# Patient Record
Sex: FEMALE | Race: WHITE | HISPANIC OR LATINO
[De-identification: names, ages, dates, MRNs, and addresses within clinical notes are randomized per-mention and may not be internally consistent; named-entity substitution may affect disease eponyms.]

---

## 2021-07-27 PROBLEM — Z00.00 ENCOUNTER FOR PREVENTIVE HEALTH EXAMINATION: Status: ACTIVE | Noted: 2021-07-27

## 2021-07-28 ENCOUNTER — APPOINTMENT (OUTPATIENT)
Dept: PEDIATRIC ORTHOPEDIC SURGERY | Facility: CLINIC | Age: 61
End: 2021-07-28
Payer: COMMERCIAL

## 2021-07-28 VITALS — HEIGHT: 65 IN | BODY MASS INDEX: 29.16 KG/M2 | WEIGHT: 175 LBS

## 2021-07-28 DIAGNOSIS — M11.262 OTHER CHONDROCALCINOSIS, LEFT KNEE: ICD-10-CM

## 2021-07-28 DIAGNOSIS — M17.12 UNILATERAL PRIMARY OSTEOARTHRITIS, LEFT KNEE: ICD-10-CM

## 2021-07-28 DIAGNOSIS — Z78.9 OTHER SPECIFIED HEALTH STATUS: ICD-10-CM

## 2021-07-28 DIAGNOSIS — Z72.89 OTHER PROBLEMS RELATED TO LIFESTYLE: ICD-10-CM

## 2021-07-28 PROCEDURE — 73560 X-RAY EXAM OF KNEE 1 OR 2: CPT

## 2021-07-28 PROCEDURE — 99072 ADDL SUPL MATRL&STAF TM PHE: CPT

## 2021-07-28 PROCEDURE — 99202 OFFICE O/P NEW SF 15 MIN: CPT

## 2021-07-28 RX ORDER — MELOXICAM 15 MG/1
15 TABLET ORAL DAILY
Qty: 30 | Refills: 0 | Status: ACTIVE | COMMUNITY
Start: 2021-07-28 | End: 1900-01-01

## 2021-07-29 PROBLEM — M17.12 PRIMARY LOCALIZED OSTEOARTHRITIS OF LEFT KNEE: Status: ACTIVE | Noted: 2021-07-29

## 2021-07-29 NOTE — ASSESSMENT
[FreeTextEntry1] : Impression: Degenerative joint disease with chondrocalcinosis left knee.\par \par This patient has been made aware as to the above.  She has been placed on Mobic along with a home exercise program.  I offered steroid injection however she is fearful of this.  She will call if she is not improving.

## 2021-07-29 NOTE — HISTORY OF PRESENT ILLNESS
[de-identified] : This 60-year-old pleasant lady is seen today for evaluation of the left knee.  This patient fell 2-1/2 weeks ago and this precipitated pain swelling stiffness and limp.  She has been taking over-the-counter medications and has had some improvement though still has obvious pain and difficulty bearing weight.  Prior to this she did have occasional discomfort to the knee though did not seek attention.  There has not been any locking buckling or sensation of instability.\par \par She is status post arthroscopic knee surgery on the right side.

## 2021-07-29 NOTE — PHYSICAL EXAM
[de-identified] : Exam today she is overweight.  She has an obvious limp on the left side.  Good motion to the right hip.  The left knee range of motion 5 degrees - 105 degrees slight restriction of both flexion/extension as compared to the opposite side.  There is a moderate effusion with pain in both medial lateral compartments there is no significant instability on stress mild crepitus is noted on patellofemoral grind actively.  There is obvious posterior medial joint line tenderness as well with a mildly positive Steinmann.  Popliteal fossa calf neurovascular exam are negative.\par \par X-rays standing of the left knee taken today 2 views reveal moderate degenerative change there is also chondrocalcinosis present.

## 2021-08-10 RX ORDER — NABUMETONE 750 MG/1
750 TABLET ORAL TWICE DAILY
Qty: 60 | Refills: 0 | Status: ACTIVE | COMMUNITY
Start: 2021-08-10 | End: 1900-01-01